# Patient Record
(demographics unavailable — no encounter records)

---

## 2025-06-19 NOTE — DISCUSSION/SUMMARY
[Medication Risks Reviewed] : Medication risks reviewed [Surgical risks reviewed] : Surgical risks reviewed [de-identified] : 58 yo F with 18 day old fibula fx XR demonstrates adequate alignment minimally displaced and shortened, discussed with pt and family operative and non-operative intervention due to the face that she has been walking on it and it is minimally displaced appears to be stable and would recommend non-operative tx in the form of camboot immobilization and vitamin D  Protected wb/nwb RTC 2 weeks  next visit: repeat R ankle xr nwb if significantly displaced consider surgery

## 2025-06-19 NOTE — HISTORY OF PRESENT ILLNESS
[de-identified] : 06/19/2025: Date of Injury/Onset: 3 weeks ago Pain: At Rest: 2 With Activity: 4 Mechanism of injury: Patient reports right ankle pain x about 3 weeks, reports she twisted her right ankle after a fall.  Patient visited  ER, had x-rays done, was sent home with a splint and surgical shoes. Patient is ambulating with a cane. Patient denies N/T. Patient states pain is localized.  This is NOT a Work Related Injury being treated under Worker's Compensation. This is NOT an athletic injury occurring associated with an interscholastic or organized sports team. Quality of symptoms: swelling Improves with: ibuprofen  Worse with: walking, standing, stairs Prior treatment: splint, surgical shoes Prior Imaging: x-rays Reports Available For Review Today:  ER Out of work/sport: not working

## 2025-06-19 NOTE — IMAGING
[de-identified] :  RIGHT ANKLE  Inspection:  + swelling  Palpation: + tenderness fibular shaft and anterior capsule  Range of Motion: normal dorsiflexion, normal plantarflexion  Strength: normal   Neurovascular intact distally

## 2025-07-10 NOTE — HISTORY OF PRESENT ILLNESS
[de-identified] : 06/19/2025: Date of Injury/Onset: 3 weeks ago Pain: At Rest: 2 With Activity: 4 Mechanism of injury: Patient reports right ankle pain x about 3 weeks, reports she twisted her right ankle after a fall.  Patient visited  ER, had x-rays done, was sent home with a splint and surgical shoes. Patient is ambulating with a cane. Patient denies N/T. Patient states pain is localized.  This is NOT a Work Related Injury being treated under Worker's Compensation. This is NOT an athletic injury occurring associated with an interscholastic or organized sports team. Quality of symptoms: swelling Improves with: ibuprofen  Worse with: walking, standing, stairs Prior treatment: splint, surgical shoes Prior Imaging: x-rays Reports Available For Review Today:  ER Out of work/sport: not working   07/08/2025 patient is here to follow up on rt ankle. notes doing much better pt is ambulating with cane.

## 2025-07-10 NOTE — DISCUSSION/SUMMARY
[Medication Risks Reviewed] : Medication risks reviewed [Surgical risks reviewed] : Surgical risks reviewed [de-identified] : 60 yo F with 18 day old fibula fx XR demonstrates adequate alignment minimally displaced and shortened, discussed with pt and family operative and non-operative intervention due to the face that she has been walking on it and it is minimally displaced appears to be stable and would recommend non-operative tx in the form of camboot immobilization and vitamin D  Protected wb/nwb RTC 2 weeks  next visit: repeat R ankle xr nwb if significantly displaced consider surgery *** 7.10 1 month out old fibula fx XR demonstrates interval healing due to the face that she has been walking on it and it is minimally displaced appears to be stable and would recommend non-operative tx in the form of camboot immobilization and vitamin D  Protected wb/nwb RTC 2 weeks  next visit: repeat R ankle xr wb if improved discuss aso and PT

## 2025-07-10 NOTE — HISTORY OF PRESENT ILLNESS
[de-identified] : 06/19/2025: Date of Injury/Onset: 3 weeks ago Pain: At Rest: 2 With Activity: 4 Mechanism of injury: Patient reports right ankle pain x about 3 weeks, reports she twisted her right ankle after a fall.  Patient visited  ER, had x-rays done, was sent home with a splint and surgical shoes. Patient is ambulating with a cane. Patient denies N/T. Patient states pain is localized.  This is NOT a Work Related Injury being treated under Worker's Compensation. This is NOT an athletic injury occurring associated with an interscholastic or organized sports team. Quality of symptoms: swelling Improves with: ibuprofen  Worse with: walking, standing, stairs Prior treatment: splint, surgical shoes Prior Imaging: x-rays Reports Available For Review Today:  ER Out of work/sport: not working   07/08/2025 patient is here to follow up on rt ankle. notes doing much better pt is ambulating with cane.

## 2025-07-10 NOTE — IMAGING
[de-identified] :  RIGHT ANKLE  Inspection:  + swelling  Palpation: + tenderness fibular shaft and anterior capsule  Range of Motion: normal dorsiflexion, normal plantarflexion  Strength: normal   Neurovascular intact distally

## 2025-07-10 NOTE — DATA REVIEWED
[FreeTextEntry1] : 3 v r ankle minimally displaced and shortened fibula fx  3 v r ankle 7.10 minimally displaced and shortened fibula fx interval callous formation

## 2025-07-10 NOTE — DISCUSSION/SUMMARY
[Medication Risks Reviewed] : Medication risks reviewed [Surgical risks reviewed] : Surgical risks reviewed [de-identified] : 60 yo F with 18 day old fibula fx XR demonstrates adequate alignment minimally displaced and shortened, discussed with pt and family operative and non-operative intervention due to the face that she has been walking on it and it is minimally displaced appears to be stable and would recommend non-operative tx in the form of camboot immobilization and vitamin D  Protected wb/nwb RTC 2 weeks  next visit: repeat R ankle xr nwb if significantly displaced consider surgery *** 7.10 1 month out old fibula fx XR demonstrates interval healing due to the face that she has been walking on it and it is minimally displaced appears to be stable and would recommend non-operative tx in the form of camboot immobilization and vitamin D  Protected wb/nwb RTC 2 weeks  next visit: repeat R ankle xr wb if improved discuss aso and PT

## 2025-07-24 NOTE — DISCUSSION/SUMMARY
[Medication Risks Reviewed] : Medication risks reviewed [Surgical risks reviewed] : Surgical risks reviewed [de-identified] : 58 yo F with 18 day old fibula fx XR demonstrates adequate alignment minimally displaced and shortened, discussed with pt and family operative and non-operative intervention due to the face that she has been walking on it and it is minimally displaced appears to be stable and would recommend non-operative tx in the form of camboot immobilization and vitamin D  Protected wb/nwb RTC 2 weeks  next visit: repeat R ankle xr nwb if significantly displaced consider surgery *** 7.10 1 month out old fibula fx XR demonstrates interval healing due to the face that she has been walking on it and it is minimally displaced appears to be stable and would recommend non-operative tx in the form of camboot immobilization and vitamin D  Protected wb/nwb RTC 2 weeks  next visit: repeat R ankle xr wb if improved discuss aso and PT *** 7.24 1.5 month out old fibula fx XR demonstrates interval healing rec: ASO , PT no inversion, sleep in camboot RTC 4 weeks  next visit: repeat R ankle xr wb if improved discuss advancing PT

## 2025-07-24 NOTE — DATA REVIEWED
[FreeTextEntry1] : 3 v r ankle minimally displaced and shortened fibula fx  3 v r ankle 7.10 minimally displaced and shortened fibula fx interval callous formation 1800

## 2025-07-24 NOTE — HISTORY OF PRESENT ILLNESS
[de-identified] : 06/19/2025: Date of Injury/Onset: 3 weeks ago Pain: At Rest: 2 With Activity: 4 Mechanism of injury: Patient reports right ankle pain x about 3 weeks, reports she twisted her right ankle after a fall.  Patient visited [facility], had x-rays done, was sent home with a splint and surgical shoes. Patient is ambulating with a cane. Patient denies N/T. Patient states pain is localized.  This is NOT a Work Related Injury being treated under Worker's Compensation. This is NOT an athletic injury occurring associated with an interscholastic or organized sports team. Quality of symptoms: swelling Improves with: ibuprofen  Worse with: walking, standing, stairs Prior treatment: splint, surgical shoes Prior Imaging: x-rays Reports Available For Review Today: [facility] Out of work/sport: not working   07/08/2025 patient is here to follow up on rt ankle. notes doing much better pt is ambulating with cane.   07/24/2025 JET  is here today to follow up on right ankle. She had been compliant with cam boot & taking vitamins D.  Takes Tylenol as needed. Patient is ambulating with a cane.

## 2025-07-24 NOTE — IMAGING
[de-identified] :  RIGHT ANKLE  Inspection:  trace swelling  Palpation: trace tenderness fibular shaft and anterior capsule  Range of Motion: normal dorsiflexion, normal plantarflexion  Strength: normal   Neurovascular intact distally